# Patient Record
Sex: FEMALE | Race: WHITE | NOT HISPANIC OR LATINO | URBAN - METROPOLITAN AREA
[De-identification: names, ages, dates, MRNs, and addresses within clinical notes are randomized per-mention and may not be internally consistent; named-entity substitution may affect disease eponyms.]

---

## 2023-01-02 PROCEDURE — U0004 COV-19 TEST NON-CDC HGH THRU: HCPCS | Performed by: NURSE PRACTITIONER

## 2023-01-04 ENCOUNTER — TELEPHONE (OUTPATIENT)
Dept: URGENT CARE | Facility: CLINIC | Age: 43
End: 2023-01-04

## 2023-01-04 NOTE — TELEPHONE ENCOUNTER
Pt stated she was seen Monday prescribed steroid and cough syrup started to get side effects to the steroid. Pt stated she did not take it yesterday but took one this morning. Also has been taking OTC Mucinex but feels her symptoms are worst wondering if an antibiotic would help. Spoke with Gely AGGARWAL stated Pt's symptoms just started 4 days ago and a antibiotic would not be appropriate this early. Advised Pt to increase fluids if secretions are thick and try a different OTC decongestant like Sudafed.  Also to discontinue taking the steroid. Informed Pt of these recommendations, Pt verbalized understanding

## 2023-05-16 ENCOUNTER — OFFICE VISIT (OUTPATIENT)
Dept: INTERNAL MEDICINE | Facility: CLINIC | Age: 43
End: 2023-05-16
Payer: COMMERCIAL

## 2023-05-16 VITALS
TEMPERATURE: 97.1 F | OXYGEN SATURATION: 98 % | HEART RATE: 91 BPM | BODY MASS INDEX: 24.49 KG/M2 | SYSTOLIC BLOOD PRESSURE: 110 MMHG | HEIGHT: 65 IN | WEIGHT: 147 LBS | DIASTOLIC BLOOD PRESSURE: 78 MMHG

## 2023-05-16 DIAGNOSIS — R82.998 URINE LEUKOCYTES: ICD-10-CM

## 2023-05-16 DIAGNOSIS — R30.0 DYSURIA: ICD-10-CM

## 2023-05-16 DIAGNOSIS — R35.0 URINARY FREQUENCY: ICD-10-CM

## 2023-05-16 DIAGNOSIS — Z76.89 ENCOUNTER TO ESTABLISH CARE: Primary | ICD-10-CM

## 2023-05-16 DIAGNOSIS — R39.15 URINARY URGENCY: ICD-10-CM

## 2023-05-16 DIAGNOSIS — J02.9 SORE THROAT: ICD-10-CM

## 2023-05-16 DIAGNOSIS — Z12.4 SCREENING FOR CERVICAL CANCER: ICD-10-CM

## 2023-05-16 LAB
BILIRUB BLD-MCNC: NEGATIVE MG/DL
CLARITY, POC: ABNORMAL
COLOR UR: YELLOW
EXPIRATION DATE: ABNORMAL
EXPIRATION DATE: NORMAL
GLUCOSE UR STRIP-MCNC: NEGATIVE MG/DL
INTERNAL CONTROL: NORMAL
KETONES UR QL: NEGATIVE
LEUKOCYTE EST, POC: ABNORMAL
Lab: ABNORMAL
Lab: NORMAL
NITRITE UR-MCNC: NEGATIVE MG/ML
PH UR: 7 [PH] (ref 5–8)
PROT UR STRIP-MCNC: NEGATIVE MG/DL
RBC # UR STRIP: NEGATIVE /UL
S PYO AG THROAT QL: NEGATIVE
SP GR UR: 1 (ref 1–1.03)
UROBILINOGEN UR QL: NORMAL

## 2023-05-16 PROCEDURE — 87070 CULTURE OTHR SPECIMN AEROBIC: CPT | Performed by: NURSE PRACTITIONER

## 2023-05-16 PROCEDURE — 87086 URINE CULTURE/COLONY COUNT: CPT | Performed by: NURSE PRACTITIONER

## 2023-05-16 PROCEDURE — 87205 SMEAR GRAM STAIN: CPT | Performed by: NURSE PRACTITIONER

## 2023-05-16 RX ORDER — LIDOCAINE HYDROCHLORIDE 20 MG/ML
5 SOLUTION OROPHARYNGEAL 2 TIMES DAILY PRN
Qty: 100 ML | Refills: 0 | Status: SHIPPED | OUTPATIENT
Start: 2023-05-16

## 2023-05-16 RX ORDER — IBUPROFEN 200 MG
200 TABLET ORAL EVERY 6 HOURS PRN
COMMUNITY

## 2023-05-16 RX ORDER — NITROFURANTOIN 25; 75 MG/1; MG/1
100 CAPSULE ORAL 2 TIMES DAILY
Qty: 10 CAPSULE | Refills: 0 | Status: SHIPPED | OUTPATIENT
Start: 2023-05-16 | End: 2023-05-21

## 2023-05-16 RX ORDER — CETIRIZINE HYDROCHLORIDE 10 MG/1
10 TABLET ORAL DAILY
COMMUNITY

## 2023-05-17 LAB — BACTERIA SPEC AEROBE CULT: NO GROWTH

## 2023-05-17 NOTE — PROGRESS NOTES
Office Note     Name: Flor Vazquez    : 1980     MRN: [id number]     Chief Complaint  Urinary Urgency (Possible UTI, urinary incontinence, dysuria: has taken azos to help with symptoms which started 2023), Sore Throat (Right side pain, swollen. ), and Establish Care    Subjective     History of Present Illness:  Flor Vazquez is a 42 y.o. female who presents today establish care with a new provider.  Patient reports she has not had primary care for quite some time now.  She reports not having insurance for a while until recently.  She complains of sore throat.  She reports this is worse to the right side of her throat.  She does feel that it is swollen.  She would also like to discuss lower urinary tract symptoms.  She reports onset of symptoms was Tuesday.  Patient complained of dysuria, not emptying her bladder, urgency, and a cloudiness to her urine.  She is also noticed some lower abdominal pain, burning with urination, and low abdominal pressure is improving.  She reports excellent water intake.  She has been using Azo tablets as needed and drinking cranberry juice with little relief in symptoms.  She is a previous smoker.  She quit in 2022.  She was a half a pack per day smoker for 10 years.  She does note past marijuana and cocaine use.  She does not use alcohol regularly.  She has not used any recreational drugs in 10 years.  Alcohol use socially.  She does note that her alcohol intake did increase during COVID.  She denies further complaints or concerns at this time.      Past Medical History:   Diagnosis Date   • Allergic Child    Sinusitis at least twice a year   • Arthritis 2017    Plantar fascitis   • Headache Child   • Low back pain Early 20s   • Urinary tract infection 5/10/23   • Visual impairment     Wear contact/glasses       Past Surgical History:   Procedure Laterality Date   • FRACTURE SURGERY  2018    Fractured patella, have only 3/4 or so now  "      Social History     Socioeconomic History   • Marital status: Single   Tobacco Use   • Smoking status: Former     Packs/day: 0.00     Years: 0.00     Pack years: 0.00     Types: Cigarettes     Quit date: 10/1/2022     Years since quittin.6   • Smokeless tobacco: Never   Vaping Use   • Vaping Use: Some days   Substance and Sexual Activity   • Alcohol use: Yes     Alcohol/week: 7.0 standard drinks     Types: 1 Glasses of wine, 6 Cans of beer per week     Comment: Average   • Drug use: Never   • Sexual activity: Yes     Partners: Male     Birth control/protection: None         Current Outpatient Medications:   •  cetirizine (zyrTEC) 10 MG tablet, Take 1 tablet by mouth Daily., Disp: , Rfl:   •  ibuprofen (ADVIL,MOTRIN) 200 MG tablet, Take 1 tablet by mouth Every 6 (Six) Hours As Needed for Mild Pain., Disp: , Rfl:   •  Lidocaine Viscous HCl (XYLOCAINE) 2 % solution, Take 5 mL by mouth 2 (Two) Times a Day As Needed for Mild Pain or Moderate Pain., Disp: 100 mL, Rfl: 0  •  nitrofurantoin, macrocrystal-monohydrate, (Macrobid) 100 MG capsule, Take 1 capsule by mouth 2 (Two) Times a Day for 5 days., Disp: 10 capsule, Rfl: 0    Objective     Vital Signs  /78   Pulse 91   Temp 97.1 °F (36.2 °C)   Ht 165.1 cm (65\")   Wt 66.7 kg (147 lb)   SpO2 98%   BMI 24.46 kg/m²   Estimated body mass index is 24.46 kg/m² as calculated from the following:    Height as of this encounter: 165.1 cm (65\").    Weight as of this encounter: 66.7 kg (147 lb).    BMI is within normal parameters. No other follow-up for BMI required.      Physical Exam  Constitutional:       Appearance: Normal appearance. She is normal weight.   HENT:      Head: Normocephalic and atraumatic.      Right Ear: Tympanic membrane, ear canal and external ear normal.      Left Ear: Tympanic membrane, ear canal and external ear normal.      Nose: Nose normal.      Mouth/Throat:      Mouth: Mucous membranes are moist.      Pharynx: Oropharynx is clear. " Posterior oropharyngeal erythema present. No oropharyngeal exudate.   Eyes:      Extraocular Movements: Extraocular movements intact.      Conjunctiva/sclera: Conjunctivae normal.      Pupils: Pupils are equal, round, and reactive to light.   Cardiovascular:      Rate and Rhythm: Normal rate and regular rhythm.   Pulmonary:      Effort: Pulmonary effort is normal. No respiratory distress.      Breath sounds: Normal breath sounds.   Musculoskeletal:         General: Normal range of motion.      Cervical back: Neck supple.   Skin:     General: Skin is warm and dry.   Neurological:      General: No focal deficit present.      Mental Status: She is alert and oriented to person, place, and time. Mental status is at baseline.   Psychiatric:         Mood and Affect: Mood normal.         Behavior: Behavior normal.         Thought Content: Thought content normal.         Judgment: Judgment normal.          Assessment and Plan     Diagnoses and all orders for this visit:    1. Encounter to establish care (Primary)    2. Sore throat  -     POCT rapid strep A  -     Wound Culture - Wound, Oropharynx; Future  -     Wound Culture - Wound, Oropharynx  -     Lidocaine Viscous HCl (XYLOCAINE) 2 % solution; Take 5 mL by mouth 2 (Two) Times a Day As Needed for Mild Pain or Moderate Pain.  Dispense: 100 mL; Refill: 0    3. Urinary urgency  -     POCT urinalysis dipstick, automated  -     Urine Culture - Urine, Urine, Clean Catch; Future  -     Urine Culture - Urine, Urine, Clean Catch    4. Dysuria  -     POCT urinalysis dipstick, automated  -     Urine Culture - Urine, Urine, Clean Catch; Future  -     Urine Culture - Urine, Urine, Clean Catch    5. Urinary frequency  -     POCT urinalysis dipstick, automated  -     Urine Culture - Urine, Urine, Clean Catch; Future  -     Urine Culture - Urine, Urine, Clean Catch    6. Screening for cervical cancer  -     Ambulatory Referral to Gynecology    7. Urine leukocytes  -     nitrofurantoin,  macrocrystal-monohydrate, (Macrobid) 100 MG capsule; Take 1 capsule by mouth 2 (Two) Times a Day for 5 days.  Dispense: 10 capsule; Refill: 0    Plan:  Strep swab in the office today negative.  Throat culture pending.  UA in the office with 3+ leukocytes.  Urine culture pending.  Will treat with nitrofurantoin twice daily for 5 days.  Will also give lidocaine viscous to use as needed for throat complaints.  Further plan of care based on culture results.  Encourage adequate oral hydration.  Return to clinic in 1 month for routine follow-up.  Return to clinic sooner if needed.    Follow Up  Return in about 1 month (around 6/16/2023) for Annual.    ALESSIA Chambers    Part of this note may be an electronic transcription/translation of spoken language to printed text using the Dragon Dictation System.

## 2023-05-18 LAB
BACTERIA SPEC AEROBE CULT: NORMAL
GRAM STN SPEC: NORMAL

## 2023-05-19 ENCOUNTER — PATIENT ROUNDING (BHMG ONLY) (OUTPATIENT)
Dept: INTERNAL MEDICINE | Facility: CLINIC | Age: 43
End: 2023-05-19
Payer: COMMERCIAL

## 2023-08-03 ENCOUNTER — OFFICE VISIT (OUTPATIENT)
Dept: INTERNAL MEDICINE | Facility: CLINIC | Age: 43
End: 2023-08-03
Payer: COMMERCIAL

## 2023-08-03 VITALS
HEIGHT: 65 IN | WEIGHT: 146 LBS | OXYGEN SATURATION: 98 % | HEART RATE: 72 BPM | BODY MASS INDEX: 24.32 KG/M2 | SYSTOLIC BLOOD PRESSURE: 102 MMHG | TEMPERATURE: 96.7 F | DIASTOLIC BLOOD PRESSURE: 64 MMHG

## 2023-08-03 DIAGNOSIS — F43.29 STRESS AND ADJUSTMENT REACTION: ICD-10-CM

## 2023-08-03 DIAGNOSIS — F43.9 TRAUMA AND STRESSOR-RELATED DISORDER: ICD-10-CM

## 2023-08-03 DIAGNOSIS — F41.9 ANXIETY: ICD-10-CM

## 2023-08-03 DIAGNOSIS — Z09 FOLLOW-UP EXAM: Primary | ICD-10-CM

## 2023-08-03 RX ORDER — HYDROXYZINE HYDROCHLORIDE 25 MG/1
25 TABLET, FILM COATED ORAL NIGHTLY PRN
Qty: 30 TABLET | Refills: 2 | Status: SHIPPED | OUTPATIENT
Start: 2023-08-03

## 2023-08-19 ENCOUNTER — HOSPITAL ENCOUNTER (OUTPATIENT)
Dept: MAMMOGRAPHY | Facility: HOSPITAL | Age: 43
Discharge: HOME | End: 2023-08-19
Admitting: NURSE PRACTITIONER
Payer: COMMERCIAL

## 2023-08-19 DIAGNOSIS — Z12.31 ENCOUNTER FOR SCREENING MAMMOGRAM FOR MALIGNANT NEOPLASM OF BREAST: ICD-10-CM

## 2023-08-19 PROCEDURE — 77067 SCR MAMMO BI INCL CAD: CPT

## 2023-08-19 PROCEDURE — 77063 BREAST TOMOSYNTHESIS BI: CPT

## 2023-08-24 ENCOUNTER — OFFICE VISIT (OUTPATIENT)
Dept: OBSTETRICS AND GYNECOLOGY | Facility: CLINIC | Age: 43
End: 2023-08-24
Payer: COMMERCIAL

## 2023-08-24 VITALS
HEIGHT: 65 IN | BODY MASS INDEX: 24.53 KG/M2 | WEIGHT: 147.2 LBS | SYSTOLIC BLOOD PRESSURE: 116 MMHG | DIASTOLIC BLOOD PRESSURE: 78 MMHG

## 2023-08-24 DIAGNOSIS — R87.610 ATYPICAL SQUAMOUS CELL CHANGES OF UNDETERMINED SIGNIFICANCE (ASCUS) ON CERVICAL CYTOLOGY WITH POSITIVE HIGH RISK HUMAN PAPILLOMA VIRUS (HPV): Primary | ICD-10-CM

## 2023-08-24 DIAGNOSIS — R87.810 ATYPICAL SQUAMOUS CELL CHANGES OF UNDETERMINED SIGNIFICANCE (ASCUS) ON CERVICAL CYTOLOGY WITH POSITIVE HIGH RISK HUMAN PAPILLOMA VIRUS (HPV): Primary | ICD-10-CM

## 2023-08-24 RX ORDER — MELATONIN
1000 DAILY
COMMUNITY

## 2023-08-24 RX ORDER — THIAMINE HCL 100 MG
1 TABLET ORAL NIGHTLY
COMMUNITY

## 2023-08-24 NOTE — PROGRESS NOTES
Chief Complaint  Flor Vazquez is a 42 y.o.  female presenting for Procedure (Colposcopy.)    History of Present Illness  Flor is a very pleasant 41yo, young woman, , here for colposcopy of the cervix.  She was a former smoker, but quit in Oct 2022.  She has a distant PSHx of LEEP (maybe in her 20s yo).    Recent pap (23) of ASCUS with + HR HPV  (Non 16,18/45)  LMP 23.      We had a lengthy discussion re: the abn paps, the procedure, and all her questions were answered to her satisfaction.  A consent form was signed.      The following portions of the patient's history were reviewed and updated as appropriate: allergies, current medications, past family history, past medical history, past social history, past surgical history, and problem list.    Allergies   Allergen Reactions    Amoxicillin Rash         Current Outpatient Medications:     Cholecalciferol 25 MCG (1000 UT) tablet, Take 1 tablet by mouth Daily., Disp: , Rfl:     COLLAGEN PO, Take  by mouth., Disp: , Rfl:     etonogestrel-ethinyl estradiol (NuvaRing) 0.12-0.015 MG/24HR vaginal ring, Insert vaginally and leave in place for 3 consecutive weeks, then remove for 1 week., Disp: 1 each, Rfl: 12    hydrOXYzine (ATARAX) 25 MG tablet, Take 1 tablet by mouth At Night As Needed for Itching., Disp: 30 tablet, Rfl: 2    ibuprofen (ADVIL,MOTRIN) 200 MG tablet, Take 1 tablet by mouth Every 6 (Six) Hours As Needed for Mild Pain., Disp: , Rfl:     Magnesium 500 MG tablet, Take 1 tablet by mouth Every Night., Disp: , Rfl:     multivitamin with minerals tablet tablet, Take 1 tablet by mouth Daily., Disp: , Rfl:     multivitamin with minerals tablet tablet, Take 1 tablet by mouth Daily., Disp: , Rfl:     Potassium 95 MG tablet, Take 1 tablet by mouth As Needed., Disp: , Rfl:     Probiotic Product (PROBIOTIC BLEND PO), Take 1 tablet by mouth Daily., Disp: , Rfl:     Past Medical History:   Diagnosis Date    Abnormal Pap smear of cervix      "Allergic Child    Sinusitis at least twice a year    Arthritis 2017    Plantar fascitis    Headache Child    Low back pain Early 20s    Urinary tract infection 5/10/23    Visual impairment     Wear contact/glasses        Past Surgical History:   Procedure Laterality Date    BREAST BIOPSY Left     2016    CERVICAL BIOPSY  W/ LOOP ELECTRODE EXCISION      FRACTURE SURGERY  9/18/2018    Fractured patella, have only 3/4 or so now    FRACTURE SURGERY      thumb       Objective  /78   Ht 165.1 cm (65\")   Wt 66.8 kg (147 lb 3.2 oz)   LMP 08/08/2023 (Approximate)   Breastfeeding No   BMI 24.50 kg/mý     Physical Exam    Assessment/Plan   Diagnoses and all orders for this visit:    1. 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  Consent form signed.  There was no abnormal discharge in the vagina.  The cervix is without leukoplakia or atypical vessels.  I can visualize all 360 degrees of the current SCJ.  Small area of AW lesion with fairly well delineated borders, only in the 12:00-1:00 area (Bx taken there)  ECC taken.  Quick hemostasis with Monsel's solution.  Pt verbalized good understanding of aftercare instructions.  Impression is mild dysplasia.                No follow-ups on file.    ALESSIA Lopez  08/24/2023  "

## 2023-08-25 LAB — REF LAB TEST METHOD: NORMAL
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
## 2023-09-11 ENCOUNTER — OFFICE VISIT (OUTPATIENT)
Dept: BEHAVIORAL HEALTH | Facility: CLINIC | Age: 43
End: 2023-09-11
Payer: COMMERCIAL

## 2023-09-11 DIAGNOSIS — F43.9 TRAUMA AND STRESSOR-RELATED DISORDER: Primary | ICD-10-CM

## 2023-09-11 DIAGNOSIS — F41.1 GENERALIZED ANXIETY DISORDER: ICD-10-CM
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
## 2023-09-26 ENCOUNTER — OFFICE VISIT (OUTPATIENT)
Dept: BEHAVIORAL HEALTH | Facility: CLINIC | Age: 43
End: 2023-09-26
Payer: COMMERCIAL

## 2023-09-26 VITALS
HEIGHT: 65 IN | SYSTOLIC BLOOD PRESSURE: 114 MMHG | HEART RATE: 82 BPM | OXYGEN SATURATION: 98 % | BODY MASS INDEX: 24.49 KG/M2 | WEIGHT: 147 LBS | DIASTOLIC BLOOD PRESSURE: 68 MMHG

## 2023-09-26 DIAGNOSIS — F41.1 GENERALIZED ANXIETY DISORDER: Primary | ICD-10-CM

## 2023-09-26 RX ORDER — ESCITALOPRAM OXALATE 10 MG/1
10 TABLET ORAL DAILY
Qty: 30 TABLET | Refills: 0 | Status: SHIPPED | OUTPATIENT
Start: 2023-09-26
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
## 2023-09-27 ENCOUNTER — OFFICE VISIT (OUTPATIENT)
Dept: INTERNAL MEDICINE | Facility: CLINIC | Age: 43
End: 2023-09-27
Payer: COMMERCIAL

## 2023-09-27 VITALS
SYSTOLIC BLOOD PRESSURE: 106 MMHG | OXYGEN SATURATION: 99 % | HEIGHT: 65 IN | BODY MASS INDEX: 24.49 KG/M2 | HEART RATE: 88 BPM | DIASTOLIC BLOOD PRESSURE: 72 MMHG | WEIGHT: 147 LBS | TEMPERATURE: 96.9 F

## 2023-09-27 DIAGNOSIS — J01.00 ACUTE NON-RECURRENT MAXILLARY SINUSITIS: Primary | ICD-10-CM

## 2023-09-27 DIAGNOSIS — J02.9 SORE THROAT: ICD-10-CM

## 2023-09-27 DIAGNOSIS — J34.89 SINUS PAIN: ICD-10-CM

## 2023-09-27 DIAGNOSIS — R09.81 NASAL CONGESTION: ICD-10-CM

## 2023-09-27 DIAGNOSIS — R51.9 ACUTE NONINTRACTABLE HEADACHE, UNSPECIFIED HEADACHE TYPE: ICD-10-CM

## 2023-09-27 LAB
EXPIRATION DATE: NORMAL
EXPIRATION DATE: NORMAL
FLUAV AG UPPER RESP QL IA.RAPID: NOT DETECTED
FLUBV AG UPPER RESP QL IA.RAPID: NOT DETECTED
INTERNAL CONTROL: NORMAL
INTERNAL CONTROL: NORMAL
Lab: NORMAL
Lab: NORMAL
S PYO AG THROAT QL: NEGATIVE
SARS-COV-2 AG UPPER RESP QL IA.RAPID: NOT DETECTED

## 2023-09-27 RX ORDER — CEFDINIR 300 MG/1
300 CAPSULE ORAL 2 TIMES DAILY
Qty: 10 CAPSULE | Refills: 0 | Status: SHIPPED | OUTPATIENT
Start: 2023-09-27 | End: 2023-10-02

## 2023-09-27 RX ORDER — ETONOGESTREL AND ETHINYL ESTRADIOL .12; .015 MG/D; MG/D
RING VAGINAL
COMMUNITY
Start: 2023-09-27

## 2023-09-27 NOTE — PROGRESS NOTES
Office Note     Name: Flor Vazquez    : 1980     MRN: [id number]     Chief Complaint  Sore Throat (Sx started a week ago. ) and URI    Subjective     History of Present Illness:  Flor Vazquez is a 42 y.o. female who presents today for evaluation of upper respiratory complaints.  Patient reports onset of symptoms was 1 week ago on .  Patient complains of sore throat, sinus pressure, ear pressure, ears popping, nasal congestion, headache, postnasal drainage, sore and swollen neck, and tooth pain.  Patient denies cough.  Patient denies known fever.  She feels like the lymph nodes in her neck are swollen and tender to touch.  She denies any contact with known sick persons.  She has tried over-the-counter Sudafed, vitamin C, Mucinex, DayQuil, NyQuil, and alternating Tylenol and ibuprofen.  She feels little relief in symptoms with over-the-counter interventions.  She denies further complaints or concerns at this time.  She presents today for evaluation of the above acute complaints.  Had a pleasant visit with the patient today.      Past Medical History:   Diagnosis Date    Abnormal Pap smear of cervix     Allergic Child    Sinusitis at least twice a year    Anxiety     Arthritis 2017    Plantar fascitis    Headache Child    Low back pain Early 20s    Urinary tract infection 5/10/23    Visual impairment     Wear contact/glasses       Past Surgical History:   Procedure Laterality Date    BREAST BIOPSY Left     2016    CERVICAL BIOPSY  W/ LOOP ELECTRODE EXCISION      FRACTURE SURGERY  2018    Fractured patella, have only 3/4 or so now    FRACTURE SURGERY      thumb       Social History     Socioeconomic History    Marital status: Single   Tobacco Use    Smoking status: Former     Packs/day: 0.00     Years: 0.00     Pack years: 0.00     Types: Cigarettes     Quit date: 10/1/2022     Years since quittin.9    Smokeless tobacco: Never   Vaping Use    Vaping Use: Some days   Substance and  "Sexual Activity    Alcohol use: Yes     Alcohol/week: 6.0 standard drinks     Types: 6 Cans of beer per week     Comment: Average    Drug use: Never    Sexual activity: Not Currently     Partners: Male     Birth control/protection: None         Current Outpatient Medications:     cholecalciferol (VITAMIN D3) 25 MCG (1000 UT) tablet, Take 1 tablet by mouth Daily., Disp: , Rfl:     COLLAGEN PO, Take  by mouth., Disp: , Rfl:     EluRyng 0.12-0.015 MG/24HR vaginal ring, , Disp: , Rfl:     escitalopram (Lexapro) 10 MG tablet, Take 1 tablet by mouth Daily., Disp: 30 tablet, Rfl: 0    hydrOXYzine (ATARAX) 25 MG tablet, Take 1 tablet by mouth At Night As Needed for Itching., Disp: 30 tablet, Rfl: 2    ibuprofen (ADVIL,MOTRIN) 200 MG tablet, Take 1 tablet by mouth Every 6 (Six) Hours As Needed for Mild Pain., Disp: , Rfl:     Magnesium 500 MG tablet, Take 1 tablet by mouth Every Night., Disp: , Rfl:     multivitamin with minerals tablet tablet, Take 1 tablet by mouth Daily., Disp: , Rfl:     multivitamin with minerals tablet tablet, Take 1 tablet by mouth Daily., Disp: , Rfl:     Potassium 95 MG tablet, Take 1 tablet by mouth As Needed., Disp: , Rfl:     Probiotic Product (PROBIOTIC BLEND PO), Take 1 tablet by mouth Daily., Disp: , Rfl:     cefdinir (OMNICEF) 300 MG capsule, Take 1 capsule by mouth 2 (Two) Times a Day for 5 days., Disp: 10 capsule, Rfl: 0    Objective     Vital Signs  /72   Pulse 88   Temp 96.9 °F (36.1 °C)   Ht 165.1 cm (65\")   Wt 66.7 kg (147 lb)   SpO2 99%   BMI 24.46 kg/m²   Estimated body mass index is 24.46 kg/m² as calculated from the following:    Height as of this encounter: 165.1 cm (65\").    Weight as of this encounter: 66.7 kg (147 lb).    BMI is within normal parameters. No other follow-up for BMI required.      Physical Exam  Constitutional:       General: She is not in acute distress.     Appearance: Normal appearance. She is not ill-appearing.   HENT:      Head: Normocephalic and " atraumatic.      Right Ear: Tympanic membrane, ear canal and external ear normal.      Left Ear: Tympanic membrane, ear canal and external ear normal.      Nose:      Right Sinus: Maxillary sinus tenderness and frontal sinus tenderness present.      Left Sinus: Maxillary sinus tenderness and frontal sinus tenderness present.      Mouth/Throat:      Mouth: Mucous membranes are moist.      Pharynx: Oropharynx is clear. No oropharyngeal exudate or posterior oropharyngeal erythema.   Eyes:      Extraocular Movements: Extraocular movements intact.      Conjunctiva/sclera: Conjunctivae normal.      Pupils: Pupils are equal, round, and reactive to light.   Cardiovascular:      Rate and Rhythm: Normal rate and regular rhythm.      Heart sounds: Normal heart sounds.   Pulmonary:      Effort: Pulmonary effort is normal. No respiratory distress.      Breath sounds: Normal breath sounds.   Musculoskeletal:         General: Normal range of motion.      Cervical back: Neck supple.   Skin:     General: Skin is warm and dry.   Neurological:      General: No focal deficit present.      Mental Status: She is alert and oriented to person, place, and time. Mental status is at baseline.   Psychiatric:         Mood and Affect: Mood normal.         Behavior: Behavior normal.         Thought Content: Thought content normal.         Judgment: Judgment normal.        Assessment and Plan     Diagnoses and all orders for this visit:    1. Acute non-recurrent maxillary sinusitis (Primary)  -     cefdinir (OMNICEF) 300 MG capsule; Take 1 capsule by mouth 2 (Two) Times a Day for 5 days.  Dispense: 10 capsule; Refill: 0    2. Sore throat  -     POCT rapid strep A  -     POCT SARS-CoV-2 Antigen LALA + Flu    3. Nasal congestion  -     POCT SARS-CoV-2 Antigen LALA + Flu    4. Sinus pain    5. Acute nonintractable headache, unspecified headache type    Plan:  COVID and flu swab in the office today negative.  Rapid strep swab in the office today  negative.  Prescription for cefdinir twice daily for 5 days sent to the pharmacy on file.  May continue to use Sudafed and Mucinex as needed at home for symptoms.  May also continue to use Tylenol and ibuprofen as needed for symptoms.  Continue with adequate oral hydration and rest.  Advised patient to stay home from work today to rest and treat symptoms.  Return to clinic if symptoms worsen or fail to improve with current plan of care.    Follow Up  Return if symptoms worsen or fail to improve.    ALESSIA Chambers    Part of this note may be an electronic transcription/translation of spoken language to printed text using the Dragon Dictation System.

## 2023-10-03 ENCOUNTER — OFFICE VISIT (OUTPATIENT)
Dept: BEHAVIORAL HEALTH | Facility: CLINIC | Age: 43
End: 2023-10-03
Payer: COMMERCIAL

## 2023-10-03 DIAGNOSIS — F43.9 TRAUMA AND STRESSOR-RELATED DISORDER: ICD-10-CM

## 2023-10-03 DIAGNOSIS — F41.1 GENERALIZED ANXIETY DISORDER: Primary | ICD-10-CM

## 2023-10-03 NOTE — PROGRESS NOTES
[facility] [place]                 Follow Up Adult      Follow Up Adult Note     Date:10/03/2023   Patient Name: Flor Vazquez  : 1980   MRN: [id number]   Time IN: 11:00am    Time OUT: 11:46am     Referring Provider: Monica Ramon APRN    Chief Complaint:      ICD-10-CM ICD-9-CM   1. Generalized anxiety disorder  F41.1 300.02   2. Trauma and stressor-related disorder  F43.9 309.81     308.9        History of Present Illness:   Flor Vazquez is a 42 y.o. female who is being seen today for follow up counseling for anxiety and trauma related symptoms      Patient's Support Network Includes:  mother    Functional Status: Moderate impairment     Progress Toward Goal: Not at goal    Prognosis: Good      Medications:     Current Outpatient Medications:     cholecalciferol (VITAMIN D3) 25 MCG (1000 UT) tablet, Take 1 tablet by mouth Daily., Disp: , Rfl:     COLLAGEN PO, Take  by mouth., Disp: , Rfl:     EluRyng 0.12-0.015 MG/24HR vaginal ring, , Disp: , Rfl:     escitalopram (Lexapro) 10 MG tablet, Take 1 tablet by mouth Daily., Disp: 30 tablet, Rfl: 0    hydrOXYzine (ATARAX) 25 MG tablet, Take 1 tablet by mouth At Night As Needed for Itching., Disp: 30 tablet, Rfl: 2    ibuprofen (ADVIL,MOTRIN) 200 MG tablet, Take 1 tablet by mouth Every 6 (Six) Hours As Needed for Mild Pain., Disp: , Rfl:     Magnesium 500 MG tablet, Take 1 tablet by mouth Every Night., Disp: , Rfl:     multivitamin with minerals tablet tablet, Take 1 tablet by mouth Daily., Disp: , Rfl:     multivitamin with minerals tablet tablet, Take 1 tablet by mouth Daily., Disp: , Rfl:     Potassium 95 MG tablet, Take 1 tablet by mouth As Needed., Disp: , Rfl:     Probiotic Product (PROBIOTIC BLEND PO), Take 1 tablet by mouth Daily., Disp: , Rfl:     Allergies:   Allergies   Allergen Reactions    Amoxicillin Rash       Objective     Physical Exam:  Vital Signs: There were no vitals filed for this  visit.  There is no height or weight on file to calculate BMI.     Mental Status Exam:   Hygiene:   good  Cooperation:  Cooperative  Eye Contact:  Good  Psychomotor Behavior:  Appropriate  Affect:  Full range  Mood: normal  Speech:  Normal  Thought Process:  Goal directed  Thought Content:  Mood congruent  Suicidal:  None  Homicidal:  None  Hallucinations:  None  Delusion:  None  Memory:  Intact  Orientation:  Person, Place, Time, and Situation  Reliability:  good  Insight:  Good  Judgement:  Good  Impulse Control:  Good  Physical/Medical Issues:  No      Assessment / Plan      Visit Diagnosis/Orders Placed This Visit:    ICD-10-CM ICD-9-CM   1. Generalized anxiety disorder  F41.1 300.02   2. Trauma and stressor-related disorder  F43.9 309.81     308.9      Patient presents in office for follow-up.  Patient reports that overall she is doing well.  Patient and I discussed aspects related to past relationships and patient reports relationship anxiety as well as an anxious attachment style.  Patient reports that she does fear abandonment.  Patient and I discussed where this may be rooted and patient reports this includes relationship with father and mother.  Patient and I briefly discussed EMDR and patient is agreeable to engaging.  Patient identified possible negative cognition of I am not good enough.  I provided patient with negative cognitions list and patient will review before next appointment.    PLAN:  Safety: No acute safety concerns  Risk Assessment: Risk of self-harm acutely is low. Risk of self-harm chronically is also low, but could be further elevated in the event of treatment noncompliance and/or AODA.    Treatment Plan/Goals: Continue supportive psychotherapy efforts and medications as indicated. Treatment and medication options discussed during today's visit. Patient ackowledged and verbally consented to continue with current treatment plan and was educated on the importance of compliance with  treatment and follow-up appointments. Patient seems reasonably able to adhere to treatment plan.      Assisted Patient in processing above session content; acknowledged and normalized patient’s thoughts, feelings, and concerns.  Rationalized patient thought process regarding current symptoms and stressors.       Allowed Patient to freely discuss issues  without interruption or judgement with unconditional positive regard, active listening skills, and empathy. Therapist provided a safe, confidential environment to facilitate the development of a positive therapeutic relationship and encouraged open, honest communication. Assisted Patient in identifying risk factors which would indicate the need for higher level of care including thoughts to harm self or others and/or self-harming behavior and encouraged Patient to contact this office, call 911, or present to the nearest emergency room should any of these events occur. Discussed crisis intervention services and means to access. Patient adamantly and convincingly denies current suicidal or homicidal ideation or perceptual disturbance. Assisted Patient in processing session content; acknowledged and normalized Patient’s thoughts, feelings, and concerns by utilizing a person-centered approach in efforts to build appropriate rapport and a positive therapeutic relationship with open and honest communication. .     Quality Measures:     TOBACCO USE:  Former smoker    I advised Flor of the risks of tobacco use.     Follow Up:   No follow-ups on file.      Sandy Pena LCSW

## 2023-10-17 ENCOUNTER — OFFICE VISIT (OUTPATIENT)
Dept: BEHAVIORAL HEALTH | Facility: CLINIC | Age: 43
End: 2023-10-17
Payer: COMMERCIAL

## 2023-10-17 VITALS
HEART RATE: 90 BPM | OXYGEN SATURATION: 98 % | HEIGHT: 65 IN | BODY MASS INDEX: 24.99 KG/M2 | WEIGHT: 150 LBS | SYSTOLIC BLOOD PRESSURE: 114 MMHG | DIASTOLIC BLOOD PRESSURE: 68 MMHG

## 2023-10-17 DIAGNOSIS — F41.1 GENERALIZED ANXIETY DISORDER: ICD-10-CM

## 2023-10-17 RX ORDER — ESCITALOPRAM OXALATE 10 MG/1
10 TABLET ORAL DAILY
Qty: 30 TABLET | Refills: 0 | Status: SHIPPED | OUTPATIENT
Start: 2023-10-17

## 2023-10-17 NOTE — PROGRESS NOTES
Follow Up Office Visit      Patient Name: Flor Vazquez  : 1980   MRN: [id number]     Referring Provider: Monica Ramon APRN    Chief Complaint:      ICD-10-CM ICD-9-CM   1. Generalized anxiety disorder  F41.1 300.02        History of Present Illness:   Flor Vazquez is a 43 y.o. female who is here today for follow up and medication management    Subjective      Patient Reports: that she feels like the lexapro is ok. She reports that she has felt more calm at times, and is falling asleep better. She also reports that she has not had any immense surges of anxiety and feels more clear headed. She reports that she still feels an underlying sense of anxiety consistently. She reports that she is taking it at night before bed, because it makes her feel tired, which has helped with her sleep. She reports that she has noticed and upset stomach for the first two full weeks, and also had some headaches, but both of those have subsided. She reports that she is off work this week, and may try to take it in the morning. She reports that she has seen [facility] for therapy twice and this has been going well. She reports that she wants to work on triggers, and her overthinking. She reports that she is using the hydroxyzine every now and then. Denies SI/Hi/AVH.    Review of Systems:   Review of Systems   Constitutional:  Negative for appetite change and unexpected weight change.   Eyes:  Negative for visual disturbance.   Respiratory:  Negative for chest tightness and shortness of breath.    Cardiovascular:  Negative for chest pain.   Musculoskeletal:  Negative for gait problem.   Skin:  Negative for rash and wound.   Neurological:  Positive for headaches. Negative for dizziness, tremors, seizures, weakness and light-headedness.   Psychiatric/Behavioral:  Negative for agitation, behavioral problems, confusion, decreased concentration, dysphoric mood, hallucinations, self-injury, sleep disturbance and suicidal  "ideas. The patient is nervous/anxious. The patient is not hyperactive.      Sleep pattern: sleeping more restful, not up as often, up for about 40 minutes, but can fall asleep faster  Appetite: Not over eating as much, still has gotten up a couple times in the night to eat, not super hungry like before     Screening Scores:   PHQ-9 : 10  HERMES-7 : 8    RISK ASSESSMENT:  Patient denies any thoughts or intent of suicide today. Patient denies any impulsive behavior today.     Medications:     Current Outpatient Medications:     cholecalciferol (VITAMIN D3) 25 MCG (1000 UT) tablet, Take 1 tablet by mouth Daily., Disp: , Rfl:     COLLAGEN PO, Take  by mouth., Disp: , Rfl:     EluRyng 0.12-0.015 MG/24HR vaginal ring, , Disp: , Rfl:     escitalopram (Lexapro) 10 MG tablet, Take 1 tablet by mouth Daily., Disp: 30 tablet, Rfl: 0    hydrOXYzine (ATARAX) 25 MG tablet, Take 1 tablet by mouth At Night As Needed for Itching., Disp: 30 tablet, Rfl: 2    ibuprofen (ADVIL,MOTRIN) 200 MG tablet, Take 1 tablet by mouth Every 6 (Six) Hours As Needed for Mild Pain., Disp: , Rfl:     Magnesium 500 MG tablet, Take 1 tablet by mouth Every Night., Disp: , Rfl:     multivitamin with minerals tablet tablet, Take 1 tablet by mouth Daily., Disp: , Rfl:     multivitamin with minerals tablet tablet, Take 1 tablet by mouth Daily., Disp: , Rfl:     Potassium 95 MG tablet, Take 1 tablet by mouth As Needed., Disp: , Rfl:     Probiotic Product (PROBIOTIC BLEND PO), Take 1 tablet by mouth Daily., Disp: , Rfl:     Medication Considerations:  DOUGIE reviewed and appropriate.      Allergies:   Allergies   Allergen Reactions    Amoxicillin Rash           Objective     Physical Exam:  Vital Signs:   Vitals:    10/17/23 1200 10/17/23 1202   BP:  114/68   Pulse:  90   SpO2:  98%   Weight: 68 kg (150 lb)    Height: 165.1 cm (65\")      Body mass index is 24.96 kg/m².     Mental Status Exam:   Hygiene:   good  Cooperation:  Cooperative  Eye Contact:  " Good  Psychomotor Behavior:  Appropriate  Affect:  Appropriate  Mood: normal  Speech:  Normal  Thought Process:  Goal directed and Linear  Thought Content:  Normal  Suicidal:  None  Homicidal:  None  Hallucinations:  None  Delusion:  None  Memory:  Intact  Orientation:  Person, Place, Time, and Situation  Reliability:  good  Insight:  Good  Judgement:  Good  Impulse Control:  Good  Physical/Medical Issues:  No      Assessment / Plan      Visit Diagnosis/Orders Placed This Visit:  Diagnoses and all orders for this visit:    1. Generalized anxiety disorder  -     escitalopram (Lexapro) 10 MG tablet; Take 1 tablet by mouth Daily.  Dispense: 30 tablet; Refill: 0         Functional Status: No impairment    Prognosis: Good with Ongoing Treatment     Impression/Formulation:  Patient appeared alert and oriented.  Patient is voluntarily requesting to continue outpatient psychiatric treatment at [facility].  Patient is receptive to assistance with maintaining a stable lifestyle.  Patient presents with history of     ICD-10-CM ICD-9-CM   1. Generalized anxiety disorder  F41.1 300.02     Reviewed patient's previous provider notes. Patient meets DSM V diagnostic criteria for diagnoses. Diagnoses may be updated as more information becomes available.       Treatment Plan:   Continue lexapro 10 mg  Continue individual therapy  Follow up in 1 month or sooner if needed  Patient will continue supportive psychotherapy efforts and medications as indicated. Clinic will obtain release of information for current treatment team for continuity of care as needed. Patient will contact this office, call 911 or present to the nearest emergency room should suicidal or homicidal ideations occur.  Discussed medication options and treatment plan of prescribed medication(s) as well as the risks, benefits, and potential side effects. Patient ackowledged and verbally consented to continue with current treatment plan and was  educated on the importance of compliance with treatment and follow-up appointments.     Patient instructions:  Instructed will take 4-6 weeks for full therapeutic effect. Medication risks and side effects discussed with patient including risk for worsening mood, changes in behavior, thoughts of suicide or homicide, induction of beti, serotonin syndrome.   If any thoughts of SI or HI, worsening mood or changes in behavior, call 911 or crisis line [phone], or go to nearest ER at once. Pt.verbalizes understanding and consents to treatment with this medication.     Follow Up:   Return in about 1 month (around 11/17/2023) for Med Check.        ALESSIA Guaman, PMHNP-BC  [facility]

## 2023-11-09 ENCOUNTER — TELEPHONE (OUTPATIENT)
Dept: INTERNAL MEDICINE | Facility: CLINIC | Age: 43
End: 2023-11-09
Payer: COMMERCIAL

## 2023-11-09 NOTE — TELEPHONE ENCOUNTER
Called patient and LVM, appointment with Sandy Pena has been changed to a mychart video visit due to Sandy being out of the office. Office number given to return call if this does not work.

## 2023-11-16 ENCOUNTER — OFFICE VISIT (OUTPATIENT)
Dept: BEHAVIORAL HEALTH | Facility: CLINIC | Age: 43
End: 2023-11-16
Payer: COMMERCIAL

## 2023-11-16 VITALS
OXYGEN SATURATION: 99 % | BODY MASS INDEX: 25.33 KG/M2 | HEIGHT: 65 IN | DIASTOLIC BLOOD PRESSURE: 68 MMHG | SYSTOLIC BLOOD PRESSURE: 108 MMHG | WEIGHT: 152 LBS | HEART RATE: 94 BPM

## 2023-11-16 DIAGNOSIS — F41.1 GENERALIZED ANXIETY DISORDER: ICD-10-CM

## 2023-11-16 DIAGNOSIS — F41.9 ANXIETY: ICD-10-CM

## 2023-11-16 DIAGNOSIS — F43.9 TRAUMA AND STRESSOR-RELATED DISORDER: ICD-10-CM

## 2023-11-16 DIAGNOSIS — F43.29 STRESS AND ADJUSTMENT REACTION: ICD-10-CM

## 2023-11-16 RX ORDER — ESCITALOPRAM OXALATE 10 MG/1
10 TABLET ORAL DAILY
Qty: 30 TABLET | Refills: 2 | Status: SHIPPED | OUTPATIENT
Start: 2023-11-16

## 2023-11-16 RX ORDER — HYDROXYZINE HYDROCHLORIDE 25 MG/1
25 TABLET, FILM COATED ORAL NIGHTLY PRN
Qty: 30 TABLET | Refills: 2 | Status: SHIPPED | OUTPATIENT
Start: 2023-11-16

## 2023-11-16 NOTE — PROGRESS NOTES
Follow Up Office Visit      Patient Name: Flor Vazquez  : 1980   MRN: [id number]     Referring Provider: Monica Ramon APRN    Chief Complaint:      ICD-10-CM ICD-9-CM   1. Anxiety  F41.9 300.00   2. Stress and adjustment reaction  F43.29 309.89   3. Trauma and stressor-related disorder  F43.9 309.81     308.9   4. Generalized anxiety disorder  F41.1 300.02        History of Present Illness:   Flor Vazquez is a 43 y.o. female who is here today for follow up and medication management    Subjective      Patient Reports: that she restarted her birth control/nuva ring, and she has been kind of tejada and emotional since starting it. She reports that she has been bleeding for the last two weeks. She reports that prior to that she felt like she had been doing pretty well, but had one really terrible day that she couldn't determine what had triggered it. She reports that she has been using the hydroxyzine 2-3 times a week, mostly to sleep. She reports that overall she has felt a lot better at work and is more consistent and cheerful. Denies SI/HI/AVH.    Review of Systems:   Review of Systems   Constitutional:  Negative for appetite change and unexpected weight change.   Eyes:  Negative for visual disturbance.   Respiratory:  Negative for chest tightness and shortness of breath.    Cardiovascular:  Negative for chest pain.   Musculoskeletal:  Negative for gait problem.   Skin:  Negative for rash and wound.   Neurological:  Positive for headaches. Negative for dizziness, tremors, seizures, weakness and light-headedness.   Psychiatric/Behavioral:  Negative for agitation, behavioral problems, confusion, decreased concentration, dysphoric mood, hallucinations, self-injury, sleep disturbance and suicidal ideas. The patient is not hyperactive.    Resumed nuva ring recently and this has caused moodiness, and increased emotions  Sleep pattern: very tired lately, falling asleep ok, a couple nights asleep at 2  "am, but also sleeping in more, feeling tired  Appetite: over eating a little bit. Still trying to eat healthy     Screening Scores:   PHQ-9 : 8  HERMES-7 : 6    RISK ASSESSMENT:  Patient denies any thoughts or intent of suicide today. Patient denies any impulsive behavior today.     Medications:     Current Outpatient Medications:     cholecalciferol (VITAMIN D3) 25 MCG (1000 UT) tablet, Take 1 tablet by mouth Daily., Disp: , Rfl:     COLLAGEN PO, Take  by mouth., Disp: , Rfl:     EluRyng 0.12-0.015 MG/24HR vaginal ring, , Disp: , Rfl:     escitalopram (Lexapro) 10 MG tablet, Take 1 tablet by mouth Daily., Disp: 30 tablet, Rfl: 2    hydrOXYzine (ATARAX) 25 MG tablet, Take 1 tablet by mouth At Night As Needed for Itching., Disp: 30 tablet, Rfl: 2    ibuprofen (ADVIL,MOTRIN) 200 MG tablet, Take 1 tablet by mouth Every 6 (Six) Hours As Needed for Mild Pain., Disp: , Rfl:     Magnesium 500 MG tablet, Take 1 tablet by mouth Every Night., Disp: , Rfl:     multivitamin with minerals tablet tablet, Take 1 tablet by mouth Daily., Disp: , Rfl:     multivitamin with minerals tablet tablet, Take 1 tablet by mouth Daily., Disp: , Rfl:     Potassium 95 MG tablet, Take 1 tablet by mouth As Needed., Disp: , Rfl:     Probiotic Product (PROBIOTIC BLEND PO), Take 1 tablet by mouth Daily., Disp: , Rfl:     Medication Considerations:  DOUGIE reviewed and appropriate.      Allergies:   Allergies   Allergen Reactions    Amoxicillin Rash         Objective     Physical Exam:  Vital Signs:   Vitals:    11/16/23 1046 11/16/23 1050 11/16/23 1051   BP:   108/68   Pulse:  94    SpO2:  99%    Weight: 68.9 kg (152 lb)     Height: 165.1 cm (65\")       Body mass index is 25.29 kg/m².     Mental Status Exam:   Hygiene:   good  Cooperation:  Cooperative  Eye Contact:  Good  Psychomotor Behavior:  Appropriate  Affect:  Appropriate  Mood: normal  Speech:  Normal  Thought Process:  Goal directed and Linear  Thought Content:  Normal  Suicidal:  " None  Homicidal:  None  Hallucinations:  None  Delusion:  None  Memory:  Intact  Orientation:  Person, Place, Time, and Situation  Reliability:  good  Insight:  Good  Judgement:  Good  Impulse Control:  Good  Physical/Medical Issues:   see problem list      Assessment / Plan      Visit Diagnosis/Orders Placed This Visit:  Diagnoses and all orders for this visit:    1. Anxiety  -     hydrOXYzine (ATARAX) 25 MG tablet; Take 1 tablet by mouth At Night As Needed for Itching.  Dispense: 30 tablet; Refill: 2    2. Stress and adjustment reaction  -     hydrOXYzine (ATARAX) 25 MG tablet; Take 1 tablet by mouth At Night As Needed for Itching.  Dispense: 30 tablet; Refill: 2    3. Trauma and stressor-related disorder  -     hydrOXYzine (ATARAX) 25 MG tablet; Take 1 tablet by mouth At Night As Needed for Itching.  Dispense: 30 tablet; Refill: 2    4. Generalized anxiety disorder  -     escitalopram (Lexapro) 10 MG tablet; Take 1 tablet by mouth Daily.  Dispense: 30 tablet; Refill: 2         Functional Status: No impairment    Prognosis: Good with Ongoing Treatment     Impression/Formulation:  Patient appeared alert and oriented.  Patient is voluntarily requesting to continue outpatient psychiatric treatment at [facility].  Patient is receptive to assistance with maintaining a stable lifestyle.  Patient presents with history of     ICD-10-CM ICD-9-CM   1. Anxiety  F41.9 300.00   2. Stress and adjustment reaction  F43.29 309.89   3. Trauma and stressor-related disorder  F43.9 309.81     308.9   4. Generalized anxiety disorder  F41.1 300.02     Reviewed patient's previous provider notes.  Patient meets DSM V diagnostic criteria for diagnoses. Diagnoses may be updated as more information becomes available.       Treatment Plan:   Continue lexapro 10 mg daily  Follow up in 3 months or sooner if needed    Patient will continue supportive psychotherapy efforts and medications as indicated. Clinic will obtain  release of information for current treatment team for continuity of care as needed. Patient will contact this office, call 911 or present to the nearest emergency room should suicidal or homicidal ideations occur.  Discussed medication options and treatment plan of prescribed medication(s) as well as the risks, benefits, and potential side effects. Patient ackowledged and verbally consented to continue with current treatment plan and was educated on the importance of compliance with treatment and follow-up appointments.     Patient instructions:  Medication risks and side effects discussed with patient including risk for worsening mood, changes in behavior, thoughts of suicide or homicide, induction of beti, serotonin syndrome.   If any thoughts of SI or HI, worsening mood or changes in behavior, call 911 or crisis line [phone], or go to nearest ER at once. Pt.verbalizes understanding and consents to treatment with this medication.     Follow Up:   Return in about 3 months (around 2/16/2024) for Med Check.        ALESSIA Guaman, PMHNP-BC [facility]

## 2023-11-17 DIAGNOSIS — L98.9 SKIN LESION OF FACE: Primary | ICD-10-CM

## 2024-02-15 ENCOUNTER — OFFICE VISIT (OUTPATIENT)
Dept: BEHAVIORAL HEALTH | Facility: CLINIC | Age: 44
End: 2024-02-15
Payer: COMMERCIAL

## 2024-02-15 VITALS
DIASTOLIC BLOOD PRESSURE: 68 MMHG | OXYGEN SATURATION: 98 % | WEIGHT: 158 LBS | HEART RATE: 78 BPM | HEIGHT: 65 IN | SYSTOLIC BLOOD PRESSURE: 118 MMHG | BODY MASS INDEX: 26.33 KG/M2

## 2024-02-15 DIAGNOSIS — F41.1 GENERALIZED ANXIETY DISORDER: ICD-10-CM

## 2024-02-15 RX ORDER — ESCITALOPRAM OXALATE 10 MG/1
10 TABLET ORAL DAILY
Qty: 90 TABLET | Refills: 1 | Status: SHIPPED | OUTPATIENT
Start: 2024-02-15

## 2024-02-16 DIAGNOSIS — F43.29 STRESS AND ADJUSTMENT REACTION: ICD-10-CM

## 2024-02-16 DIAGNOSIS — F41.9 ANXIETY: ICD-10-CM

## 2024-02-16 DIAGNOSIS — F43.9 TRAUMA AND STRESSOR-RELATED DISORDER: ICD-10-CM

## 2024-02-16 RX ORDER — HYDROXYZINE HYDROCHLORIDE 25 MG/1
25 TABLET, FILM COATED ORAL NIGHTLY PRN
Qty: 30 TABLET | Refills: 2 | Status: SHIPPED | OUTPATIENT
Start: 2024-02-16 | End: 2024-02-17 | Stop reason: SDUPTHER

## 2024-02-17 DIAGNOSIS — F41.9 ANXIETY: ICD-10-CM

## 2024-02-17 DIAGNOSIS — F43.29 STRESS AND ADJUSTMENT REACTION: ICD-10-CM

## 2024-02-17 DIAGNOSIS — F43.9 TRAUMA AND STRESSOR-RELATED DISORDER: ICD-10-CM

## 2024-02-17 RX ORDER — HYDROXYZINE HYDROCHLORIDE 25 MG/1
25 TABLET, FILM COATED ORAL NIGHTLY PRN
Qty: 90 TABLET | Refills: 1 | Status: SHIPPED | OUTPATIENT
Start: 2024-02-17

## 2024-07-24 ENCOUNTER — LAB (OUTPATIENT)
Dept: LAB | Facility: HOSPITAL | Age: 44
End: 2024-07-24
Payer: COMMERCIAL

## 2024-07-24 ENCOUNTER — OFFICE VISIT (OUTPATIENT)
Dept: INTERNAL MEDICINE | Facility: CLINIC | Age: 44
End: 2024-07-24
Payer: COMMERCIAL

## 2024-07-24 VITALS
HEART RATE: 100 BPM | DIASTOLIC BLOOD PRESSURE: 52 MMHG | BODY MASS INDEX: 27.29 KG/M2 | WEIGHT: 163.8 LBS | HEIGHT: 65 IN | OXYGEN SATURATION: 97 % | SYSTOLIC BLOOD PRESSURE: 106 MMHG

## 2024-07-24 DIAGNOSIS — Z13.29 SCREENING FOR HYPOTHYROIDISM: ICD-10-CM

## 2024-07-24 DIAGNOSIS — Z00.00 ANNUAL PHYSICAL EXAM: Primary | ICD-10-CM

## 2024-07-24 DIAGNOSIS — Z13.21 ENCOUNTER FOR VITAMIN DEFICIENCY SCREENING: ICD-10-CM

## 2024-07-24 DIAGNOSIS — Z13.220 SCREENING FOR HYPERLIPIDEMIA: ICD-10-CM

## 2024-07-24 DIAGNOSIS — F41.1 GAD (GENERALIZED ANXIETY DISORDER): ICD-10-CM

## 2024-07-24 LAB
25(OH)D3 SERPL-MCNC: 47.5 NG/ML (ref 30–100)
ALBUMIN SERPL-MCNC: 4.4 G/DL (ref 3.5–5.2)
ALBUMIN/GLOB SERPL: 1.9 G/DL
ALP SERPL-CCNC: 48 U/L (ref 39–117)
ALT SERPL W P-5'-P-CCNC: 12 U/L (ref 1–33)
ANION GAP SERPL CALCULATED.3IONS-SCNC: 11.4 MMOL/L (ref 5–15)
AST SERPL-CCNC: 22 U/L (ref 1–32)
BILIRUB SERPL-MCNC: 0.2 MG/DL (ref 0–1.2)
BUN SERPL-MCNC: 16 MG/DL (ref 6–20)
BUN/CREAT SERPL: 18.2 (ref 7–25)
CALCIUM SPEC-SCNC: 9.3 MG/DL (ref 8.6–10.5)
CHLORIDE SERPL-SCNC: 103 MMOL/L (ref 98–107)
CHOLEST SERPL-MCNC: 211 MG/DL (ref 0–200)
CO2 SERPL-SCNC: 24.6 MMOL/L (ref 22–29)
CREAT SERPL-MCNC: 0.88 MG/DL (ref 0.57–1)
DEPRECATED RDW RBC AUTO: 41 FL (ref 37–54)
EGFRCR SERPLBLD CKD-EPI 2021: 84.8 ML/MIN/1.73
ERYTHROCYTE [DISTWIDTH] IN BLOOD BY AUTOMATED COUNT: 11.8 % (ref 12.3–15.4)
GLOBULIN UR ELPH-MCNC: 2.3 GM/DL
GLUCOSE SERPL-MCNC: 89 MG/DL (ref 65–99)
HCT VFR BLD AUTO: 40.8 % (ref 34–46.6)
HDLC SERPL-MCNC: 95 MG/DL (ref 40–60)
HGB BLD-MCNC: 13.7 G/DL (ref 12–15.9)
LDLC SERPL CALC-MCNC: 105 MG/DL (ref 0–100)
LDLC/HDLC SERPL: 1.09 {RATIO}
MCH RBC QN AUTO: 31.9 PG (ref 26.6–33)
MCHC RBC AUTO-ENTMCNC: 33.6 G/DL (ref 31.5–35.7)
MCV RBC AUTO: 95.1 FL (ref 79–97)
PLATELET # BLD AUTO: 188 10*3/MM3 (ref 140–450)
PMV BLD AUTO: 10.4 FL (ref 6–12)
POTASSIUM SERPL-SCNC: 4.9 MMOL/L (ref 3.5–5.2)
PROT SERPL-MCNC: 6.7 G/DL (ref 6–8.5)
RBC # BLD AUTO: 4.29 10*6/MM3 (ref 3.77–5.28)
SODIUM SERPL-SCNC: 139 MMOL/L (ref 136–145)
TRIGL SERPL-MCNC: 61 MG/DL (ref 0–150)
TSH SERPL DL<=0.05 MIU/L-ACNC: 1.67 UIU/ML (ref 0.27–4.2)
VLDLC SERPL-MCNC: 11 MG/DL (ref 5–40)
WBC NRBC COR # BLD AUTO: 4.54 10*3/MM3 (ref 3.4–10.8)

## 2024-07-24 PROCEDURE — 85027 COMPLETE CBC AUTOMATED: CPT | Performed by: NURSE PRACTITIONER

## 2024-07-24 PROCEDURE — 82306 VITAMIN D 25 HYDROXY: CPT | Performed by: NURSE PRACTITIONER

## 2024-07-24 PROCEDURE — 80061 LIPID PANEL: CPT | Performed by: NURSE PRACTITIONER

## 2024-07-24 PROCEDURE — 99396 PREV VISIT EST AGE 40-64: CPT | Performed by: NURSE PRACTITIONER

## 2024-07-24 PROCEDURE — 36415 COLL VENOUS BLD VENIPUNCTURE: CPT | Performed by: NURSE PRACTITIONER

## 2024-07-24 PROCEDURE — 84443 ASSAY THYROID STIM HORMONE: CPT | Performed by: NURSE PRACTITIONER

## 2024-07-24 PROCEDURE — 80053 COMPREHEN METABOLIC PANEL: CPT | Performed by: NURSE PRACTITIONER

## 2024-07-24 NOTE — PROGRESS NOTES
Annual Physical     Name: Flor Vazquez    : 1980     MRN: [id number]     Chief Complaint  Annual Exam (physical)    Subjective     History of Present Illness:  Flor Vazquez is a 43 y.o. female who presents today for annual physical exam.    The patient is being seen for a health maintenance evaluation.    Past Medical History:   Diagnosis Date    Abnormal Pap smear of cervix     Allergic Child    Sinusitis at least twice a year    Anxiety     Arthritis 2017    Plantar fascitis    Headache Child    Low back pain Early 20s    Urinary tract infection 5/10/23    Visual impairment     Wear contact/glasses       Past Surgical History:   Procedure Laterality Date    BREAST BIOPSY Left     2016    CERVICAL BIOPSY  W/ LOOP ELECTRODE EXCISION      FRACTURE SURGERY  2018    Fractured patella, have only 3/4 or so now    FRACTURE SURGERY      thumb       Social History     Socioeconomic History    Marital status: Single   Tobacco Use    Smoking status: Former     Average packs/day: 6.0 packs/day for 20.8 years (124.7 ttl pk-yrs)     Types: Cigarettes     Start date: 2002    Smokeless tobacco: Never   Vaping Use    Vaping status: Some Days    Substances: Nicotine, Flavoring    Devices: Disposable   Substance and Sexual Activity    Alcohol use: Yes     Alcohol/week: 6.0 standard drinks of alcohol     Types: 6 Cans of beer per week     Comment: Average    Drug use: Never    Sexual activity: Not Currently     Partners: Male     Birth control/protection: None         Current Outpatient Medications:     COLLAGEN PO, Take  by mouth., Disp: , Rfl:     escitalopram (Lexapro) 10 MG tablet, Take 1 tablet by mouth Daily., Disp: 90 tablet, Rfl: 1    hydrOXYzine (ATARAX) 25 MG tablet, Take 1 tablet by mouth At Night As Needed for Anxiety., Disp: 90 tablet, Rfl: 1    ibuprofen (ADVIL,MOTRIN) 200 MG tablet, Take 1 tablet by mouth Every 6 (Six) Hours As Needed for Mild Pain., Disp: , Rfl:     multivitamin with  "minerals tablet tablet, Take 1 tablet by mouth Daily., Disp: , Rfl:     Potassium 95 MG tablet, Take 1 tablet by mouth As Needed., Disp: , Rfl:     Probiotic Product (PROBIOTIC BLEND PO), Take 1 tablet by mouth Daily., Disp: , Rfl:     General History  Flor  does have regular dental visits. Needs to R/S visit.  She does complain of vision problems. Last eye exam was April 2022. Wears glasses and contacts.  Immunizations are not up to date. The patient needs the following immunizations: Covid booster and Tdap.    Lifestyle  Flor  consumes in general, a \"healthy\" diet  . Eating less fast foods.  She exercises  Pilates twice weekly, walks a lot, walks dog, strength training twice weekly .    Reproductive Health  Flor  is perimenopausal.  She reports periods are irregular.  She is not sexually active. Her contraceptive plan is no method, abstinence.    Screening  Last pap was July 2023.  Last Completed Pap Smear            PAP SMEAR (Every 3 Years) Next due on 7/19/2026 07/19/2023  LIQUID-BASED PAP SMEAR WITH HPV GENOTYPING REGARDLESS OF INTERPRETATION ([facility])                . History of abnormal pap smear or family history of gyn cancer: Bx with last pap that came back neg. Hx of LEEP procedure. No FH.  Last mammogram was August 2023.  Last Completed Mammogram            Ordered - MAMMOGRAM (Every 2 Years) Ordered on 8/25/2023 08/19/2023  Mammo Screening Digital Tomosynthesis Bilateral With CAD                . Personal or family history of abnormal mammograms or breast cancer: Last mammogram needed an US. Pt unable to afford. Will R/S. No FH.  Last colonoscopy was never.  Last Completed Colonoscopy       This patient has no relevant Health Maintenance data.        . Family history of colon cancer: No FH.  Last DEXA was never.    Health Maintenance Summary            Overdue - BMI FOLLOWUP (Yearly) Never done      No completion, postpone, or frequency change history exists for this topic. " "             Overdue - HEPATITIS C SCREENING (Once) Never done      No completion, postpone, or frequency change history exists for this topic.              Postponed - COVID-19 Vaccine (3 - 2023-24 season) Postponed until 10/13/2024      07/24/2024  Postponed until 10/13/2024 by Monica Ramon APRN (Product Unavailable)    05/26/2021  Imm Admin: COVID-19 (PFIZER) Purple Cap Monovalent    05/05/2021  Imm Admin: COVID-19 (PFIZER) Purple Cap Monovalent              Postponed - TDAP/TD VACCINES (1 - Tdap) Postponed until 7/23/2025 07/24/2024  Postponed until 7/23/2025 by Monica Ramon APRN (Not Indicated)              INFLUENZA VACCINE (Yearly - August to March) Next due on 8/1/2024      No completion, postpone, or frequency change history exists for this topic.              ANNUAL PHYSICAL (Yearly) Next due on 7/24/2025 07/24/2024  Done    06/26/2023  Done              Ordered - MAMMOGRAM (Every 2 Years) Ordered on 8/25/2023 08/19/2023  Mammo Screening Digital Tomosynthesis Bilateral With CAD              PAP SMEAR (Every 3 Years) Next due on 7/19/2026 07/19/2023  LIQUID-BASED PAP SMEAR WITH HPV GENOTYPING REGARDLESS OF INTERPRETATION ([facility])              Pneumococcal Vaccine 0-64 (Series Information) Aged Out      No completion, postpone, or frequency change history exists for this topic.                  Immunization History   Administered Date(s) Administered    COVID-19 (PFIZER) Purple Cap Monovalent 05/05/2021, 05/26/2021       Review of Systems   Constitutional: Negative.    HENT: Negative.     Respiratory: Negative.     Cardiovascular: Negative.    Gastrointestinal: Negative.    Genitourinary: Negative.    Musculoskeletal: Negative.    Neurological: Negative.    Psychiatric/Behavioral: Negative.         Objective     Vital Signs  /52   Pulse 100   Ht 165.1 cm (65\")   Wt 74.3 kg (163 lb 12.8 oz)   SpO2 97%   BMI 27.26 kg/m²   Estimated body mass index is 27.26 kg/m² as " "calculated from the following:    Height as of this encounter: 165.1 cm (65\").    Weight as of this encounter: 74.3 kg (163 lb 12.8 oz).    BMI is >= 25 and <30. (Overweight) The following options were offered after discussion;: exercise counseling/recommendations and nutrition counseling/recommendations       Physical Exam  Constitutional:       General: She is awake. She is not in acute distress.     Appearance: Normal appearance. She is well-developed and well-groomed. She is not ill-appearing.   HENT:      Head: Normocephalic and atraumatic.      Right Ear: Tympanic membrane, ear canal and external ear normal.      Left Ear: Tympanic membrane, ear canal and external ear normal.      Nose: Nose normal.      Mouth/Throat:      Mouth: Mucous membranes are moist.      Pharynx: Oropharynx is clear.   Eyes:      General: No scleral icterus.     Extraocular Movements: Extraocular movements intact.      Conjunctiva/sclera: Conjunctivae normal.      Pupils: Pupils are equal, round, and reactive to light.   Neck:      Trachea: Trachea normal.   Cardiovascular:      Rate and Rhythm: Normal rate and regular rhythm.      Pulses: Normal pulses.      Heart sounds: Normal heart sounds. No murmur heard.  Pulmonary:      Effort: Pulmonary effort is normal. No tachypnea, accessory muscle usage or respiratory distress.      Breath sounds: Normal breath sounds. No wheezing, rhonchi or rales.   Abdominal:      General: Abdomen is flat. Bowel sounds are normal. There is no distension.      Palpations: Abdomen is soft.      Tenderness: There is no abdominal tenderness.   Genitourinary:     Comments: Deferred  Musculoskeletal:         General: No swelling. Normal range of motion.      Cervical back: Normal range of motion and neck supple. No tenderness.      Right lower leg: No edema.      Left lower leg: No edema.   Skin:     General: Skin is warm and dry.      Capillary Refill: Capillary refill takes less than 2 seconds.      " Coloration: Skin is not jaundiced or pale.      Findings: No lesion or rash.   Neurological:      General: No focal deficit present.      Mental Status: She is alert and oriented to person, place, and time. Mental status is at baseline.      Motor: No weakness.      Gait: Gait is intact.   Psychiatric:         Attention and Perception: Attention normal.         Mood and Affect: Mood normal.         Speech: Speech normal.         Behavior: Behavior normal. Behavior is cooperative.         Thought Content: Thought content normal.         Judgment: Judgment normal.          Assessment and Plan     Diagnoses and all orders for this visit:    1. Annual physical exam (Primary)  -     CBC (No Diff)  -     Comprehensive metabolic panel  -     TSH Rfx On Abnormal To Free T4  -     Lipid Panel  -     Vitamin D,25-Hydroxy    2. HERMES (generalized anxiety disorder)    3. Screening for hyperlipidemia  -     Lipid Panel    4. Screening for hypothyroidism  -     TSH Rfx On Abnormal To Free T4    5. Encounter for vitamin deficiency screening  -     Vitamin D,25-Hydroxy        Plan:  Annual physical exam.  Orders placed for fasting labs.  Will review lab results with patient once received and reviewed.  Further plan of care based on lab result findings.  Patient will call to reschedule breast ultrasound.  Patient up-to-date on mammogram.  Colorectal cancer screening to start at age 45.  Declines vaccinations today.  Continue with up to date immunizations and health maintenance screenings.  Continue with healthy lifestyle choices such as healthy diet and eating habits and regular exercise routine.  Continue with adequate oral hydration and rest.  Annual physical exam in 1 year.  Return to clinic sooner if needed.    Follow Up  Return in about 1 year (around 7/24/2025) for Annual.    ALESSIA Chambers    Part of this note may be an electronic transcription/translation of spoken language to printed text using the Dragon Dictation  System.

## 2024-08-01 ENCOUNTER — OFFICE VISIT (OUTPATIENT)
Dept: INTERNAL MEDICINE | Facility: CLINIC | Age: 44
End: 2024-08-01
Payer: COMMERCIAL

## 2024-08-01 VITALS
BODY MASS INDEX: 27.16 KG/M2 | HEART RATE: 97 BPM | SYSTOLIC BLOOD PRESSURE: 118 MMHG | DIASTOLIC BLOOD PRESSURE: 74 MMHG | OXYGEN SATURATION: 99 % | HEIGHT: 65 IN | WEIGHT: 163 LBS

## 2024-08-01 DIAGNOSIS — J01.00 ACUTE NON-RECURRENT MAXILLARY SINUSITIS: Primary | ICD-10-CM

## 2024-08-01 DIAGNOSIS — J32.9 CHRONIC SINUSITIS, UNSPECIFIED LOCATION: ICD-10-CM

## 2024-08-01 PROCEDURE — 99213 OFFICE O/P EST LOW 20 MIN: CPT | Performed by: NURSE PRACTITIONER

## 2024-08-01 RX ORDER — CEFDINIR 300 MG/1
300 CAPSULE ORAL 2 TIMES DAILY
Qty: 14 CAPSULE | Refills: 0 | Status: SHIPPED | OUTPATIENT
Start: 2024-08-01 | End: 2024-08-08

## 2024-08-01 NOTE — PROGRESS NOTES
Office Note     Name: Flor Vazquez    : 1980     MRN: [id number]     Chief Complaint  Sinusitis, Dizziness, and Headache    Subjective     History of Present Illness:  Flor Vazquez is a 43 y.o. female who presents today for acute concerns    Patient regularly follows with Monica with a neck scheduled visit in     Patient is here today for concern of possible sinusitis.  She has noted dizziness as well as sinus headache.  She has taken Sudafed which does help.  History of deviated septum.  She does note some nasal drainage.  She does have a concern about some chronic sinusitis that she gets twice yearly.  She would like to be considered for referral to ENT as well        Past Medical History:   Diagnosis Date    Abnormal Pap smear of cervix     Allergic Child    Sinusitis at least twice a year    Anxiety     Arthritis 2017    Plantar fascitis    Headache Child    Low back pain Early 20s    Urinary tract infection 5/10/23    Visual impairment     Wear contact/glasses       Past Surgical History:   Procedure Laterality Date    BREAST BIOPSY Left     2016    CERVICAL BIOPSY  W/ LOOP ELECTRODE EXCISION      FRACTURE SURGERY  2018    Fractured patella, have only 3/4 or so now    FRACTURE SURGERY      thumb       Social History     Socioeconomic History    Marital status: Single   Tobacco Use    Smoking status: Former     Average packs/day: 6.0 packs/day for 20.8 years (124.7 ttl pk-yrs)     Types: Cigarettes     Start date: 2002    Smokeless tobacco: Never   Vaping Use    Vaping status: Some Days    Substances: Nicotine, Flavoring    Devices: Disposable   Substance and Sexual Activity    Alcohol use: Yes     Alcohol/week: 6.0 standard drinks of alcohol     Types: 6 Cans of beer per week     Comment: Average    Drug use: Never    Sexual activity: Not Currently     Partners: Male     Birth control/protection: None         Current Outpatient Medications:     COLLAGEN PO, Take  by mouth.,  "Disp: , Rfl:     escitalopram (Lexapro) 10 MG tablet, Take 1 tablet by mouth Daily., Disp: 90 tablet, Rfl: 1    hydrOXYzine (ATARAX) 25 MG tablet, Take 1 tablet by mouth At Night As Needed for Anxiety., Disp: 90 tablet, Rfl: 1    ibuprofen (ADVIL,MOTRIN) 200 MG tablet, Take 1 tablet by mouth Every 6 (Six) Hours As Needed for Mild Pain., Disp: , Rfl:     multivitamin with minerals tablet tablet, Take 1 tablet by mouth Daily., Disp: , Rfl:     Potassium 95 MG tablet, Take 1 tablet by mouth As Needed., Disp: , Rfl:     Probiotic Product (PROBIOTIC BLEND PO), Take 1 tablet by mouth Daily., Disp: , Rfl:     cefdinir (OMNICEF) 300 MG capsule, Take 1 capsule by mouth 2 (Two) Times a Day for 7 days., Disp: 14 capsule, Rfl: 0    Objective     Vital Signs  /74   Pulse 97   Ht 165.1 cm (65\")   Wt 73.9 kg (163 lb)   SpO2 99%   BMI 27.12 kg/m²   Estimated body mass index is 27.12 kg/m² as calculated from the following:    Height as of this encounter: 165.1 cm (65\").    Weight as of this encounter: 73.9 kg (163 lb).               Physical Exam  Vitals and nursing note reviewed.   Constitutional:       Appearance: Normal appearance.   HENT:      Head: Normocephalic and atraumatic.      Nose: Congestion present.   Eyes:      Extraocular Movements: Extraocular movements intact.      Pupils: Pupils are equal, round, and reactive to light.   Cardiovascular:      Rate and Rhythm: Normal rate and regular rhythm.   Pulmonary:      Effort: Pulmonary effort is normal.   Musculoskeletal:         General: Normal range of motion.   Skin:     General: Skin is warm and dry.   Neurological:      Mental Status: She is alert and oriented to person, place, and time.   Psychiatric:         Mood and Affect: Mood normal.         Behavior: Behavior normal.                   Assessment and Plan     Diagnoses and all orders for this visit:    1. Acute non-recurrent maxillary sinusitis (Primary)  -     cefdinir (OMNICEF) 300 MG capsule; Take 1 " capsule by mouth 2 (Two) Times a Day for 7 days.  Dispense: 14 capsule; Refill: 0    2. Chronic sinusitis, unspecified location  -     Ambulatory Referral to ENT (Otolaryngology)    Plan  Patient will be treated with cefdinir.  She has tolerated Keflex in the past.  There is allergy to amoxicillin.  She recently took doxycycline in May.  Continue with Sudafed and Afrin but for only 3 to 4 days.  Continue with antihistamines as well as Flonase.  Referral to ENT will also be placed per patient request  Go to ER if any condition worsens or severe  Plan to follow-up with Monica as scheduled    Follow Up  Return for Next scheduled follow up.    ALESSIA Callejas    Part of this note may be an electronic transcription/translation of spoken language to printed text using the Dragon Dictation System.

## 2024-08-14 ENCOUNTER — OFFICE VISIT (OUTPATIENT)
Dept: BEHAVIORAL HEALTH | Facility: CLINIC | Age: 44
End: 2024-08-14
Payer: COMMERCIAL

## 2024-08-14 VITALS
HEIGHT: 65 IN | OXYGEN SATURATION: 98 % | WEIGHT: 157 LBS | DIASTOLIC BLOOD PRESSURE: 78 MMHG | HEART RATE: 101 BPM | SYSTOLIC BLOOD PRESSURE: 110 MMHG | BODY MASS INDEX: 26.16 KG/M2

## 2024-08-14 DIAGNOSIS — F43.29 STRESS AND ADJUSTMENT REACTION: ICD-10-CM

## 2024-08-14 DIAGNOSIS — F41.1 GAD (GENERALIZED ANXIETY DISORDER): Primary | ICD-10-CM

## 2024-08-14 DIAGNOSIS — F43.9 TRAUMA AND STRESSOR-RELATED DISORDER: ICD-10-CM

## 2024-08-14 PROCEDURE — 99214 OFFICE O/P EST MOD 30 MIN: CPT | Performed by: REGISTERED NURSE

## 2024-08-14 RX ORDER — CETIRIZINE HYDROCHLORIDE 10 MG/1
10 TABLET ORAL DAILY
COMMUNITY

## 2024-08-14 RX ORDER — ESCITALOPRAM OXALATE 10 MG/1
10 TABLET ORAL DAILY
Qty: 90 TABLET | Refills: 1 | Status: SHIPPED | OUTPATIENT
Start: 2024-08-14

## 2024-08-14 NOTE — PROGRESS NOTES
Follow Up Office Visit      Patient Name: Flor Vazquez  : 1980   MRN: [id number]     Referring Provider: Monica Ramon APRN    Chief Complaint:      ICD-10-CM ICD-9-CM   1. HERMES (generalized anxiety disorder)  F41.1 300.02   2. Stress and adjustment reaction  F43.29 309.89   3. Trauma and stressor-related disorder  F43.9 309.81     308.9        History of Present Illness:   Flor Vazquez is a 43 y.o. female who is here today for follow up and medication management    Subjective      Patient Reports:   History of Present Illness  The patient presents for evaluation of anxiety.    She continues her regimen of Lexapro 10 mg, taken nightly, and hydroxyzine as needed. She reports experiencing unusual dreams, which she attributes to increased anxiety. These dreams are not disturbing her sleep, and she is able to fall asleep easily and return to sleep after waking up at night. She typically sleeps for 7 to 7.5 hours per night and feels well-rested during the day. She feels that her current dose of Lexapro is effective and has noticed improved mood stability. She is not currently engaged in therapy but plans to resume. She does not report any suicidal ideation, thoughts of self-harm, or auditory or visual hallucinations.    She has been taking Sudafed and Zyrtec since May 2024 due to an upper respiratory infection, which may contribute to her daytime drowsiness. She also experiences sinus pressure and constant headaches, for which she took Sudafed this morning. Her appetite remains unchanged, but she has recently adopted a healthier diet following blood work results. She has lost approximately 7 to 8 pounds over the past few months, even with a decrease in physical activity.    She has high cholesterol and takes potassium supplements, with her potassium levels being good. She had low iron levels in the past.    Review of Systems:   Review of Systems   Constitutional:  Negative for appetite change and  unexpected weight change.   Eyes:  Negative for visual disturbance.   Respiratory:  Positive for shortness of breath. Negative for chest tightness.    Cardiovascular:  Positive for chest pain.   Gastrointestinal:  Positive for nausea.   Musculoskeletal:  Negative for gait problem.   Skin:  Negative for rash and wound.   Neurological:  Positive for headaches. Negative for dizziness, tremors, seizures, weakness and light-headedness.   Psychiatric/Behavioral:  Negative for agitation, behavioral problems, confusion, decreased concentration, dysphoric mood, hallucinations, self-injury, sleep disturbance and suicidal ideas. The patient is not hyperactive.      Sleep pattern: 7-7.5 hours, some mild daytime sleepiness due to sinus meds  Appetite: no changes in appetite, trying to eat more healthy     PHQ-9 Depression Screening  Little interest or pleasure in doing things? 1-->several days   Feeling down, depressed, or hopeless? 1-->several days   Trouble falling or staying asleep, or sleeping too much? 2-->more than half the days   Feeling tired or having little energy? 2-->more than half the days   Poor appetite or overeating? 0-->not at all   Feeling bad about yourself - or that you are a failure or have let yourself or your family down? 1-->several days   Trouble concentrating on things, such as reading the newspaper or watching television? 1-->several days   Moving or speaking so slowly that other people could have noticed? Or the opposite - being so fidgety or restless that you have been moving around a lot more than usual? 0-->not at all   Thoughts that you would be better off dead, or of hurting yourself in some way? 0-->not at all   PHQ-9 Total Score 8   If you checked off any problems, how difficult have these problems made it for you to do your work, take care of things at home, or get along with other people? somewhat difficult     HERMES-7 Anxiety Screening  Over the last two weeks, how often have you been bothered  "by the following problems?  Feeling nervous, anxious or on edge: Several days  Not being able to stop or control worrying: Several days  Worrying too much about different things: Several days  Trouble Relaxing: Not at all  Being so restless that it is hard to sit still: Several days  Becoming easily annoyed or irritable: Several days  Feeling afraid as if something awful might happen: Several days  HERMES 7 Total Score: 6  If you checked any problems, how difficult have these problems made it for you to do your work, take care of things at home, or get along with other people: Somewhat difficult    RISK ASSESSMENT:  Patient denies any thoughts or intent of suicide today. Patient denies any impulsive behavior today.     Medications:     Current Outpatient Medications:     cetirizine (zyrTEC) 10 MG tablet, Take 1 tablet by mouth Daily., Disp: , Rfl:     COLLAGEN PO, Take  by mouth., Disp: , Rfl:     escitalopram (Lexapro) 10 MG tablet, Take 1 tablet by mouth Daily., Disp: 90 tablet, Rfl: 1    hydrOXYzine (ATARAX) 25 MG tablet, Take 1 tablet by mouth At Night As Needed for Anxiety., Disp: 90 tablet, Rfl: 1    ibuprofen (ADVIL,MOTRIN) 200 MG tablet, Take 1 tablet by mouth Every 6 (Six) Hours As Needed for Mild Pain., Disp: , Rfl:     multivitamin with minerals tablet tablet, Take 1 tablet by mouth Daily., Disp: , Rfl:     Potassium 95 MG tablet, Take 1 tablet by mouth As Needed., Disp: , Rfl:     Probiotic Product (PROBIOTIC BLEND PO), Take 1 tablet by mouth Daily., Disp: , Rfl:     Medication Considerations:  DOUGIE reviewed and appropriate.      Allergies:   Allergies   Allergen Reactions    Amoxicillin Rash       Results Reviewed:   Results  Laboratory Studies  High cholesterol. Potassium level was good.    Objective     Physical Exam:  Vital Signs:   Vitals:    08/14/24 1055   Pulse: 101   SpO2: 98%   Weight: 71.2 kg (157 lb)   Height: 165.1 cm (65\")     Body mass index is 26.13 kg/m².     Mental Status Exam: "   Hygiene:   good  Cooperation:  Cooperative  Eye Contact:  Good  Psychomotor Behavior:  Appropriate  Affect:  Appropriate  Mood: normal  Speech:  Normal  Thought Process:  Goal directed and Linear  Thought Content:  Normal  Suicidal:  None  Homicidal:  None  Hallucinations:  None  Delusion:  None  Memory:  Intact  Orientation:  Person, Place, Time, and Situation  Reliability:  good  Insight:  Good  Judgement:  Good  Impulse Control:  Good  Physical/Medical Issues:   see problem list      Assessment / Plan      Visit Diagnosis/Orders Placed This Visit:  Diagnoses and all orders for this visit:    1. HERMES (generalized anxiety disorder) (Primary)    2. Stress and adjustment reaction    3. Trauma and stressor-related disorder         Assessment & Plan  1. Anxiety.  Her anxiety symptoms are well-managed with the current medication regimen. A prescription for Lexapro 10 mg, to be taken nightly, will be provided for a duration of 6 months. The potential side effects of Lexapro, including weight gain and decreased sex drive, were discussed. Hydroxyzine will not be refilled at this time as she has an adequate supply. She is advised to contact the office if an earlier appointment is needed.      Functional Status: No impairment    Prognosis: Good with Ongoing Treatment     Impression/Formulation:  Patient appeared alert and oriented.  Patient is voluntarily requesting to continue outpatient psychiatric treatment at [facility].  Patient is receptive to assistance with maintaining a stable lifestyle.  Patient presents with history of     ICD-10-CM ICD-9-CM   1. HERMES (generalized anxiety disorder)  F41.1 300.02   2. Stress and adjustment reaction  F43.29 309.89   3. Trauma and stressor-related disorder  F43.9 309.81     308.9     Reviewed patient's previous provider notes. Reviewed most recent labs. Patient meets DSM V diagnostic criteria for diagnoses. Diagnoses may be updated as more information becomes  available.       Treatment Plan:   Continue lexapro 10 mg daily  Resume individual therapy  Follow up in 6  months or sooner if needd  Patient will continue supportive psychotherapy efforts and medications as indicated. Clinic will obtain release of information for current treatment team for continuity of care as needed. Patient will contact this office, call 911 or present to the nearest emergency room should suicidal or homicidal ideations occur.  Discussed medication options and treatment plan of prescribed medication(s) as well as the risks, benefits, and potential side effects. Patient acknowledged and verbally consented to continue with current treatment plan and was educated on the importance of compliance with treatment and follow-up appointments.     Patient instructions:  Medication risks and side effects discussed with patient including risk for worsening mood, changes in behavior, thoughts of suicide or homicide, induction of beti, serotonin syndrome, withdrawal symptoms if abrupt withdrawal, sexual dysfunction.   If any thoughts of SI or HI, worsening mood or changes in behavior, call 911 or crisis line [phone], or go to nearest ER at once. Pt.verbalizes understanding and consents to treatment with this medication.     Follow Up:   Return in about 6 months (around 2/14/2025) for Med Check.    Patient or patient representative verbalized consent for the use of Ambient Listening during the visit with  ALESSIA Guaman for chart documentation. 8/14/2024  13:02 EDT    ALESSIA Guaman, PMHNP-BC [facility]

## 2024-08-19 ENCOUNTER — TELEPHONE (OUTPATIENT)
Dept: INTERNAL MEDICINE | Facility: CLINIC | Age: 44
End: 2024-08-19
Payer: COMMERCIAL

## 2025-02-21 DIAGNOSIS — F41.1 GAD (GENERALIZED ANXIETY DISORDER): ICD-10-CM

## 2025-02-24 RX ORDER — ESCITALOPRAM OXALATE 10 MG/1
10 TABLET ORAL DAILY
Qty: 90 TABLET | Refills: 1 | Status: SHIPPED | OUTPATIENT
Start: 2025-02-24

## 2025-03-31 ENCOUNTER — OFFICE VISIT (OUTPATIENT)
Dept: BEHAVIORAL HEALTH | Facility: CLINIC | Age: 45
End: 2025-03-31
Payer: COMMERCIAL

## 2025-03-31 VITALS
DIASTOLIC BLOOD PRESSURE: 74 MMHG | WEIGHT: 167 LBS | HEIGHT: 65 IN | OXYGEN SATURATION: 100 % | BODY MASS INDEX: 27.82 KG/M2 | HEART RATE: 102 BPM | SYSTOLIC BLOOD PRESSURE: 128 MMHG

## 2025-03-31 DIAGNOSIS — Z13.30 ENCOUNTER FOR BEHAVIORAL HEALTH SCREENING: ICD-10-CM

## 2025-03-31 DIAGNOSIS — F41.1 GAD (GENERALIZED ANXIETY DISORDER): Primary | ICD-10-CM

## 2025-03-31 PROCEDURE — 96127 BRIEF EMOTIONAL/BEHAV ASSMT: CPT | Performed by: REGISTERED NURSE

## 2025-03-31 PROCEDURE — 99214 OFFICE O/P EST MOD 30 MIN: CPT | Performed by: REGISTERED NURSE

## 2025-03-31 NOTE — PROGRESS NOTES
"     Follow Up Office Visit      Patient Name: Flor Vazquez  : 1980   MRN: [id number]     Referring Provider: Monica Ramon APRN    Chief Complaint:      ICD-10-CM ICD-9-CM   1. HERMES (generalized anxiety disorder)  F41.1 300.02   2. Encounter for behavioral health screening  Z13.30 V82.89        History of Present Illness:   Flor Vazquez is a 44 y.o. female who is here today for follow up and medication management    Subjective      Patient Reports:   History of Present Illness  The patient presents for evaluation of anxiety and sleep issues.    She has recently transitioned to a new job, which involves a significant increase in responsibilities and multitasking, leading to heightened stress levels. Despite the stress, she expresses satisfaction with her job. She reports an increase in anxiety, describing it as a \"dropping sensation in the stomach.\" She has been on Lexapro 10 mg since 2023. She has one refill remaining, which she plans to collect post-appointment.    Her sleep pattern is irregular, often characterized by brief periods of sleep followed by awakenings, totaling approximately 3 to 4 hours per night. She attributes this partly to her perimenopausal status, which includes night sweats that disrupt her sleep as well as her cat. She has previously used hydroxyzine for sleep, but it resulted in excessive daytime drowsiness and decreased efficacy with regular use. She has also tried magnesium, taken every three days, and potassium supplements. Her current regimen includes prenatal vitamins, multivitamins, vitamin C, and B complex.    She reports no changes in appetite, attributing any perceived changes to her altered work schedule. She does not endorse any suicidal ideation or homicidal thoughts. She reports no auditory hallucinations.      Review of Systems:   Review of Systems   Constitutional:  Negative for appetite change and unexpected weight change.   Eyes:  Negative for visual " disturbance.   Respiratory:  Negative for chest tightness.    Musculoskeletal:  Negative for gait problem.   Skin:  Negative for rash and wound.   Neurological:  Negative for dizziness, tremors, seizures, weakness and light-headedness.   Psychiatric/Behavioral:  Positive for sleep disturbance. Negative for agitation, behavioral problems, confusion, decreased concentration, dysphoric mood, hallucinations, self-injury and suicidal ideas. The patient is nervous/anxious. The patient is not hyperactive.      Sleep pattern: 3-4 hours, night sweats  Appetite: increased due to work schedule     PHQ-9 Depression Screening  Little interest or pleasure in doing things? Several days   Feeling down, depressed, or hopeless? Almost all   PHQ-2 Total Score 4   Trouble falling or staying asleep, or sleeping too much? Over half   Feeling tired or having little energy? Several days   Poor appetite or overeating? Several days   Feeling bad about yourself - or that you are a failure or have let yourself or your family down? Not at all   Trouble concentrating on things, such as reading the newspaper or watching television? Not at all   Moving or speaking so slowly that other people could have noticed? Or the opposite - being so fidgety or restless that you have been moving around a lot more than usual? Several days   Thoughts that you would be better off dead, or of hurting yourself in some way? Not at all   PHQ-9 Total Score 9   If you checked off any problems, how difficult have these problems made it for you to do your work, take care of things at home, or get along with other people? Somewhat difficult       HERMES-7 Anxiety Screening  Over the last two weeks, how often have you been bothered by the following problems?  Feeling nervous, anxious or on edge: More than half the days  Not being able to stop or control worrying: Several days  Worrying too much about different things: Several days  Trouble Relaxing: Not at all  Being so  "restless that it is hard to sit still: Several days  Becoming easily annoyed or irritable: Several days  Feeling afraid as if something awful might happen: Not at all  HERMES 7 Total Score: 6  If you checked any problems, how difficult have these problems made it for you to do your work, take care of things at home, or get along with other people: Somewhat difficult    RISK ASSESSMENT:  Patient denies any thoughts or intent of suicide today. Patient denies any impulsive behavior today.     Medications:     Current Outpatient Medications:     cetirizine (zyrTEC) 10 MG tablet, Take 1 tablet by mouth Daily., Disp: , Rfl:     COLLAGEN PO, Take  by mouth., Disp: , Rfl:     escitalopram (LEXAPRO) 10 MG tablet, TAKE 1 TABLET BY MOUTH DAILY, Disp: 90 tablet, Rfl: 1    hydrOXYzine (ATARAX) 25 MG tablet, Take 1 tablet by mouth At Night As Needed for Anxiety., Disp: 90 tablet, Rfl: 1    ibuprofen (ADVIL,MOTRIN) 200 MG tablet, Take 1 tablet by mouth Every 6 (Six) Hours As Needed for Mild Pain., Disp: , Rfl:     multivitamin with minerals tablet tablet, Take 1 tablet by mouth Daily., Disp: , Rfl:     Potassium 95 MG tablet, Take 1 tablet by mouth As Needed., Disp: , Rfl:     Probiotic Product (PROBIOTIC BLEND PO), Take 1 tablet by mouth Daily., Disp: , Rfl:     Medication Considerations:  DOUGIE reviewed and appropriate.      Allergies:   Allergies   Allergen Reactions    Amoxicillin Rash          Objective     Physical Exam:  Vital Signs:   Vitals:    03/31/25 1629 03/31/25 1630 03/31/25 1633   BP:   128/74   Pulse:   102   SpO2:   100%   Weight:  75.8 kg (167 lb)    Height: 165.1 cm (65\")       Body mass index is 27.79 kg/m².     Mental Status Exam:   Hygiene:   good   Cooperation:  Cooperative  Eye Contact:  Good  Psychomotor Behavior:  Appropriate  Affect:  Appropriate  Mood: anxious  Speech:  Normal  Thought Process:  Goal directed and Linear  Thought Content:  Normal  Suicidal:  None  Homicidal:  None  Hallucinations:  " None  Delusion:  None  Memory:  Intact  Orientation:  Person, Place, Time, and Situation  Reliability:  good  Insight:  Good  Judgement:  Good  Impulse Control:  Good  Physical/Medical Issues:   see problem list      Assessment / Plan      Visit Diagnosis/Orders Placed This Visit:  Diagnoses and all orders for this visit:    1. HERMES (generalized anxiety disorder) (Primary)    2. Encounter for behavioral health screening             Functional Status: No impairment    Prognosis: Good with Ongoing Treatment     Impression/Formulation:  Patient appeared alert and oriented.  Patient is voluntarily requesting to continue outpatient psychiatric treatment at [facility].  Patient is receptive to assistance with maintaining a stable lifestyle.  Patient presents with history of     ICD-10-CM ICD-9-CM   1. HERMES (generalized anxiety disorder)  F41.1 300.02   2. Encounter for behavioral health screening  Z13.30 V82.89   .    Reviewed patient's previous provider notes. Reviewed most recent labs. Patient meets DSM V diagnostic criteria for diagnoses. Diagnoses may be updated as more information becomes available.     Assessment & Plan  1. Anxiety.  She reports increased anxiety due to her new job responsibilities and changes in her schedule. She has been on Lexapro 10 mg since September 2023. The dosage of Lexapro will be increased to 20 mg. She is advised to take the medication with food and monitor for any side effects. She is instructed to double her current 10 mg dose for the next 3 weeks and report back on its effectiveness. If well-tolerated, a prescription for Lexapro 20 mg will be sent to [facility] on [facility].    2. Sleep disturbances.  She experiences difficulty sleeping, likely exacerbated by perimenopausal symptoms such as night sweats. She is currently taking magnesium intermittently, which may help with sleep. She is advised to take magnesium more regularly to see if it improves her  sleep quality.    Follow-up  The patient will follow up in 6 months or sooner if necessary.    Treatment Plan:   Increase lexapro to 20 mg daily      Patient will continue supportive psychotherapy efforts and medications as indicated. Clinic will obtain release of information for current treatment team for continuity of care as needed. Patient will contact this office, call 911 or present to the nearest emergency room should suicidal or homicidal ideations occur.  Discussed medication options and treatment plan of prescribed medication(s) as well as the risks, benefits, and potential side effects. Patient acknowledged and verbally consented to continue with current treatment plan and was educated on the importance of compliance with treatment and follow-up appointments.     Patient instructions:  Medication risks and side effects discussed with patient including risk for worsening mood, changes in behavior, thoughts of suicide or homicide, induction of beti, serotonin syndrome, rare hyponatremia.   If any thoughts of SI or HI, worsening mood or changes in behavior, call 911 or crisis line [phone], or go to nearest ER at once.  Pt.verbalizes understanding and consents to treatment with this medication.     Follow Up:   Return in about 6 months (around 9/30/2025) for Med Check.    Patient or patient representative verbalized consent for the use of Ambient Listening during the visit with  ALESSIA Guaman for chart documentation. 3/31/2025  17:14 EDT    ALESSIA Guaman, PMHNP-BC [facility]

## 2025-04-14 ENCOUNTER — TELEPHONE (OUTPATIENT)
Dept: INTERNAL MEDICINE | Facility: CLINIC | Age: 45
End: 2025-04-14
Payer: COMMERCIAL

## 2025-04-14 DIAGNOSIS — F41.1 GAD (GENERALIZED ANXIETY DISORDER): ICD-10-CM

## 2025-04-14 RX ORDER — ESCITALOPRAM OXALATE 10 MG/1
10 TABLET ORAL DAILY
Qty: 90 TABLET | Refills: 1 | Status: SHIPPED | OUTPATIENT
Start: 2025-04-14

## 2025-04-14 NOTE — TELEPHONE ENCOUNTER
Patient states she did not do to well with the 20 mg and would like to stay on the 10, can you send a refill of the 10 to her pharmacy?

## 2025-04-14 NOTE — TELEPHONE ENCOUNTER
PATIENT CALLED INTO THE OFFICE. SHE STATED SHE SAW ARNULFO PERALTA ON 3/31/25. SHE HAS CURRENTLY LOST HER JOB AND WAS WONDERING IF THE LEXAPRO 10 MG TABLET CAN BE CALLED IN.   SHE MENTIONED THE LAST APPOINTMENT SHE TOLD ARNULFO TO NOT CALL IN THE MEDICATION BUT SHE IS NOW REQUESTING IF POSSIBLE.     CALL BACK: [phone]